# Patient Record
Sex: MALE | Race: WHITE
[De-identification: names, ages, dates, MRNs, and addresses within clinical notes are randomized per-mention and may not be internally consistent; named-entity substitution may affect disease eponyms.]

---

## 2021-05-07 ENCOUNTER — HOSPITAL ENCOUNTER (OUTPATIENT)
Dept: HOSPITAL 35 - CAT | Age: 66
End: 2021-05-07
Attending: INTERNAL MEDICINE
Payer: COMMERCIAL

## 2021-05-07 DIAGNOSIS — Z13.6: Primary | ICD-10-CM

## 2021-05-07 DIAGNOSIS — E78.00: ICD-10-CM

## 2021-05-07 DIAGNOSIS — I25.10: ICD-10-CM

## 2021-05-13 ENCOUNTER — HOSPITAL ENCOUNTER (OUTPATIENT)
Dept: HOSPITAL 35 - SJCVCIMAG | Age: 66
End: 2021-05-13
Attending: INTERNAL MEDICINE
Payer: COMMERCIAL

## 2021-05-13 DIAGNOSIS — I10: ICD-10-CM

## 2021-05-13 DIAGNOSIS — Z82.49: ICD-10-CM

## 2021-05-13 DIAGNOSIS — R93.1: ICD-10-CM

## 2021-05-13 DIAGNOSIS — I08.8: Primary | ICD-10-CM

## 2021-07-09 ENCOUNTER — HOSPITAL ENCOUNTER (OUTPATIENT)
Dept: HOSPITAL 35 - SJCVCIMAG | Age: 66
End: 2021-07-09
Attending: INTERNAL MEDICINE
Payer: COMMERCIAL

## 2021-07-09 DIAGNOSIS — E78.5: ICD-10-CM

## 2021-07-09 DIAGNOSIS — I34.0: Primary | ICD-10-CM

## 2021-07-09 DIAGNOSIS — Z79.82: ICD-10-CM

## 2021-07-09 DIAGNOSIS — I10: ICD-10-CM

## 2021-07-09 DIAGNOSIS — R93.1: ICD-10-CM

## 2021-07-09 DIAGNOSIS — I25.10: ICD-10-CM

## 2021-07-09 DIAGNOSIS — E78.00: ICD-10-CM

## 2021-07-09 DIAGNOSIS — Z79.899: ICD-10-CM

## 2021-07-09 DIAGNOSIS — Z72.89: ICD-10-CM

## 2021-07-21 ENCOUNTER — HOSPITAL ENCOUNTER (OUTPATIENT)
Dept: HOSPITAL 35 - CATH | Age: 66
Discharge: HOME | End: 2021-07-21
Attending: INTERNAL MEDICINE
Payer: COMMERCIAL

## 2021-07-21 VITALS — SYSTOLIC BLOOD PRESSURE: 119 MMHG | DIASTOLIC BLOOD PRESSURE: 71 MMHG

## 2021-07-21 VITALS — HEIGHT: 71 IN | WEIGHT: 204 LBS | BODY MASS INDEX: 28.56 KG/M2

## 2021-07-21 DIAGNOSIS — I10: ICD-10-CM

## 2021-07-21 DIAGNOSIS — I25.10: ICD-10-CM

## 2021-07-21 DIAGNOSIS — Z79.899: ICD-10-CM

## 2021-07-21 DIAGNOSIS — R94.39: Primary | ICD-10-CM

## 2021-07-21 DIAGNOSIS — R06.00: ICD-10-CM

## 2021-07-21 DIAGNOSIS — Z82.49: ICD-10-CM

## 2021-07-21 DIAGNOSIS — Z90.49: ICD-10-CM

## 2021-07-21 DIAGNOSIS — E78.00: ICD-10-CM

## 2021-07-21 DIAGNOSIS — Z98.890: ICD-10-CM

## 2021-07-21 DIAGNOSIS — R93.1: ICD-10-CM

## 2021-07-21 NOTE — CATHLAB
[facility]
[address] [place]   [address]                   INVASIVE PROCEDURE REPORT     
_______________________________________________________________________________
 
Name:       JCARLOS MIDDLETON                    Room #:                     REG MINGO JOAY.#:      [id number]                       Account #:      [id number]  
Admission:  07/21/21    Attend Phys:    Robert Coronel MD       
Discharge:              Date of Birth:  [birth year]  
                                                          Report #: [id number]
                                                                    [id number]
_______________________________________________________________________________
THIS REPORT FOR:  
 
cc:  Amado Chavis MD, David P. MD Park, Jin S. MD                                                   
                                                                       ~
 
--------------- APPROVED REPORT --------------
 
 
Study performed:  07/21/2021 09:01:03
 
Patient Details
Patient Status: Out-Patient                  Room #: 
The patient is a 66 year-old male
 
Event Personnel
Robert Coronel  Interventional Cardiologist, Gin Cardenas RT(R)(VI) 
Monitor, Carley Sheets RTR JbubFrankie Jessica RN RN
 
Procedures Performed
Art Access - R femoral artery*  Left Heart Cath w/or w/o Coronaries 
[id number] [facility] [address] Initial Mod Sed Same Phys/QHP 5y [id number] 
Hemostasis with Manual pressure
 
Indication
Dyspnea, Positive stress test
 
Risk Factors
Hypercholesterolemia, Coronary Artery DiseaseHypertension
 
Procedure Narrative
The Right Groin^ was infiltrated with 1% Lidocaine subcutaneous 
anesthesia. A PINNACLE 4FR Sheath #[id number] sheath was inserted into 
the RFA 4F^. Coronary angiography was performed using coronary 
diagnostic catheters. The right coronary system was accessed and 
visualized with a JR4 catheter. The left coronary system was accessed 
and visualized with a JL4 catheter. The left ventricle was accessed 
and visualized with a PIGTAIL catheter. Hemostasis was obtained with 
manual pressure following sheath removal without any complications. 
The patient tolerated the procedure well and there were no 
complications associated with the procedure. There was no 
hematoma.
 
Intraoperative Conscious Sedation
Fentanyl  50 mcg Versed  1 mg  
 
 
[facility]
[address] [place]  [address]
Phone:  [phone]                    INVASIVE PROCEDURE REPORT     
_______________________________________________________________________________
 
Name:            JCARLOS MIDDLETON                    Room #:                    REG CL
[facility]#:           [id number]          Account #:     [id number]  
Admission:       07/21/21         Attend Phys:   Robert Coronel MD   
Discharge:                  Date of Birth: [birth year]  
                         Report #:      [id number]
        [id number]
_______________________________________________________________________________
 
Fluoro Time:    3.20 minutes    
Dose:     DAP 7448.50 cGycm2    
Contrast Type and Amount:  Omnipaque 89 ml    
 
Coronary Angiography
The patient's coronary anatomy is right dominant. 
 
Diagnostic Cath
Left Main The left main artery is a large-caliber vessel, appears 
angiographically normal.
LAD  The LAD is a moderate-sized caliber vessel, traversing the 
anterior wall and terminating at the apex.  There is calcification in 
the proximal and mid segments.  There is mild disease in the proximal 
segment, 20%.  There is a mild to moderate diffuse stenosis in the 
midsegment, 30 to 40%.
Diagonal 1 This is a small caliber vessel, with no flow-limiting 
lesions.
Diagonal 2 This is a small caliber vessel, with no flow-limiting 
lesions.
Circumflex The left circumflex artery is a moderate-sized caliber 
vessel with a mild stenosis proximally, 20%.
OM1  This is a moderate-sized caliber vessel, patent with no 
flow-limiting lesions.
OM2  This is a moderate-sized caliber vessel, patent with no 
flow-limiting lesions.
Right Coronary The RCA is a dominant vessel with a mild stenosis 
proximally, 20%.
R PDA  This is a small to moderate-sized caliber vessel, patent with 
no flow-limiting lesions.
RPLV  This is a small to moderate-sized caliber vessel, patent with 
no flow-limiting lesions.
 
Left Ventriculography
The left ventricle is normal in size with normal contractility. The 
left ventricular ejection fraction is estimated to be 
>55%.
 
Hemodynamics
The aortic pressure is 117/70 mmHg with a mean of 91 mmHg. The left 
ventricular pressure is 114/8 mmHg with a mean of mmHg. The left 
ventricular end diastolic pressure is 18 mmHg. 
 
Conclusion
1.  There is mild to moderate disease in the LAD.
2.  There is mild disease in the left circumflex and RCA.
 
 
[facility]
[address]
[place]  [address]
Phone:  [phone]                    INVASIVE PROCEDURE REPORT     
_______________________________________________________________________________
 
Name:            JCARLOS MIDDLETON                    Room #:                    REG [facility]#:           [id number]          Account #:     [id number]  
Admission:       07/21/21         Attend Phys:   Robert Coronel MD   
Discharge:                  Date of Birth: [birth year]  
                         Report #:      [id number]
        [id number]
_______________________________________________________________________________
3.  There is normal LV systolic function.
4.  Recommend aggressive risk factor management.
 
 
 
 
 
 
 
 
 
 
 
 
 
 
 
 
 
 
 
 
 
 
 
 
 
 
 
 
 
 
 
 
 
 
 
 
 
 
 
 
 
 
  <ELECTRONICALLY SIGNED>
   By: Robert Coronel MD               
  07/21/21     2511
D: 07/21/21 1309                           _____________________________________
T: 07/21/21 1309                           Robert Coronel MD                 /INF